# Patient Record
Sex: MALE
[De-identification: names, ages, dates, MRNs, and addresses within clinical notes are randomized per-mention and may not be internally consistent; named-entity substitution may affect disease eponyms.]

---

## 2024-10-01 ENCOUNTER — DASHBOARD ENCOUNTERS (OUTPATIENT)
Age: 69
End: 2024-10-01

## 2024-10-01 ENCOUNTER — OFFICE VISIT (OUTPATIENT)
Dept: URBAN - METROPOLITAN AREA CLINIC 115 | Facility: CLINIC | Age: 69
End: 2024-10-01
Payer: COMMERCIAL

## 2024-10-01 VITALS
SYSTOLIC BLOOD PRESSURE: 151 MMHG | HEART RATE: 76 BPM | TEMPERATURE: 97.3 F | DIASTOLIC BLOOD PRESSURE: 83 MMHG | RESPIRATION RATE: 15 BRPM | BODY MASS INDEX: 26.3 KG/M2 | WEIGHT: 167.6 LBS | HEIGHT: 67 IN

## 2024-10-01 DIAGNOSIS — R14.0 ABDOMINAL BLOATING: ICD-10-CM

## 2024-10-01 DIAGNOSIS — Z87.19 HISTORY OF GASTRITIS: ICD-10-CM

## 2024-10-01 PROBLEM — 27123005: Status: ACTIVE | Noted: 2024-10-01

## 2024-10-01 PROBLEM — 116289008: Status: ACTIVE | Noted: 2024-10-01

## 2024-10-01 PROBLEM — 691501000119103: Status: ACTIVE | Noted: 2024-10-01

## 2024-10-01 PROCEDURE — 99204 OFFICE O/P NEW MOD 45 MIN: CPT | Performed by: INTERNAL MEDICINE

## 2024-10-01 RX ORDER — PANTOPRAZOLE SODIUM 40 MG/1
1 TABLET TABLET, DELAYED RELEASE ORAL ONCE A DAY
Qty: 90 TABLET | Refills: 1 | OUTPATIENT
Start: 2024-10-01

## 2024-10-01 NOTE — HPI-TODAY'S VISIT:
70 y/o  Man from [place] with hx of gastritis on prior EGD's that refer chronic bloating and dyspepsia for almost a year,he refer multiple EGD,COlo, U/S,HIDA with no diagnostic results.Denie N/V/D,gi bleeding,anemia or weight loss.There is mention of GB sludge but no gallstones.No family hx of GI malignancy.no toxic habits.He removed cow milk from his diet,using almond milk.abdomen is soft and depresibe w/o significant tenderness.Pantoprazole 40mg hels but he is concern about long term use.I review with him the major risk are GI infections ,osteopenia,low magnesium.

## 2024-10-02 ENCOUNTER — WEB ENCOUNTER (OUTPATIENT)
Dept: URBAN - METROPOLITAN AREA CLINIC 92 | Facility: CLINIC | Age: 69
End: 2024-10-02

## 2024-10-10 LAB — RESULT:: (no result)

## 2025-04-04 ENCOUNTER — OFFICE VISIT (OUTPATIENT)
Dept: URBAN - METROPOLITAN AREA CLINIC 82 | Facility: CLINIC | Age: 70
End: 2025-04-04

## 2025-04-04 ENCOUNTER — TELEPHONE ENCOUNTER (OUTPATIENT)
Dept: URBAN - METROPOLITAN AREA CLINIC 92 | Facility: CLINIC | Age: 70
End: 2025-04-04

## 2025-04-04 RX ORDER — SIMETHICONE 180 MG/1
1 CAPSULE AFTER MEALS AND AT BEDTIME AS NEEDED CAPSULE, LIQUID FILLED ORAL TWICE A DAY
Qty: 180 CAPSULE | Refills: 0 | OUTPATIENT
Start: 2025-04-04 | End: 2025-07-03

## 2025-05-13 ENCOUNTER — OFFICE VISIT (OUTPATIENT)
Dept: URBAN - METROPOLITAN AREA CLINIC 115 | Facility: CLINIC | Age: 70
End: 2025-05-13
Payer: COMMERCIAL

## 2025-05-13 ENCOUNTER — LAB OUTSIDE AN ENCOUNTER (OUTPATIENT)
Dept: URBAN - METROPOLITAN AREA CLINIC 115 | Facility: CLINIC | Age: 70
End: 2025-05-13

## 2025-05-13 DIAGNOSIS — K82.8 GALLBLADDER SLUDGE: ICD-10-CM

## 2025-05-13 DIAGNOSIS — R14.0 ABDOMINAL BLOATING: ICD-10-CM

## 2025-05-13 DIAGNOSIS — K64.8 INTERNAL HEMORRHOIDS: ICD-10-CM

## 2025-05-13 DIAGNOSIS — Z87.19 HISTORY OF GASTRITIS: ICD-10-CM

## 2025-05-13 DIAGNOSIS — K30 INDIGESTION: ICD-10-CM

## 2025-05-13 PROBLEM — 90458007: Status: ACTIVE | Noted: 2025-05-13

## 2025-05-13 PROBLEM — 162031009: Status: ACTIVE | Noted: 2025-05-13

## 2025-05-13 PROCEDURE — 99214 OFFICE O/P EST MOD 30 MIN: CPT | Performed by: INTERNAL MEDICINE

## 2025-05-13 RX ORDER — PANTOPRAZOLE SODIUM 40 MG/1
1 TABLET TABLET, DELAYED RELEASE ORAL ONCE A DAY
Qty: 90 TABLET | Refills: 1 | Status: ACTIVE | COMMUNITY
Start: 2024-10-01

## 2025-05-13 RX ORDER — HYDROCORTISONE 2.5% 25 MG/G
1 APPLICATION CREAM TOPICAL TWICE A DAY
Qty: 60 GRAMS | Refills: 1 | OUTPATIENT
Start: 2025-05-13 | End: 2025-09-10

## 2025-05-13 RX ORDER — SIMETHICONE 180 MG/1
1 CAPSULE AFTER MEALS AND AT BEDTIME AS NEEDED CAPSULE, LIQUID FILLED ORAL TWICE A DAY
Qty: 180 CAPSULE | Refills: 0 | Status: ACTIVE | COMMUNITY
Start: 2025-04-04 | End: 2025-07-03

## 2025-05-13 NOTE — HPI-TODAY'S VISIT:
He still with dyspepsia and bloating on/off,hx of gastritis on prior CT. H pylori test was negative late 2024. Will schedule EGD to r/o PUD or neoplasm.We did not received prior endoscopic work-up.Continue OTC pro-biotics

## 2025-05-14 ENCOUNTER — CLAIMS CREATED FROM THE CLAIM WINDOW (OUTPATIENT)
Dept: URBAN - METROPOLITAN AREA CLINIC 4 | Facility: CLINIC | Age: 70
End: 2025-05-14
Payer: COMMERCIAL

## 2025-05-14 ENCOUNTER — CLAIMS CREATED FROM THE CLAIM WINDOW (OUTPATIENT)
Dept: URBAN - METROPOLITAN AREA SURGERY CENTER 13 | Facility: SURGERY CENTER | Age: 70
End: 2025-05-14
Payer: COMMERCIAL

## 2025-05-14 ENCOUNTER — TELEPHONE ENCOUNTER (OUTPATIENT)
Dept: URBAN - METROPOLITAN AREA CLINIC 82 | Facility: CLINIC | Age: 70
End: 2025-05-14

## 2025-05-14 DIAGNOSIS — K31.89 OTHER DISEASES OF STOMACH AND DUODENUM: ICD-10-CM

## 2025-05-14 DIAGNOSIS — K21.9 GASTRO-ESOPHAGEAL REFLUX DISEASE WITHOUT ESOPHAGITIS: ICD-10-CM

## 2025-05-14 DIAGNOSIS — K29.70 GASTRITIS WITHOUT BLEEDING, UNSPECIFIED CHRONICITY, UNSPECIFIED GASTRITIS TYPE: ICD-10-CM

## 2025-05-14 DIAGNOSIS — R10.13 DYSPEPSIA: ICD-10-CM

## 2025-05-14 DIAGNOSIS — K29.70 GASTRITIS, UNSPECIFIED, WITHOUT BLEEDING: ICD-10-CM

## 2025-05-14 PROCEDURE — 43239 EGD BIOPSY SINGLE/MULTIPLE: CPT | Performed by: INTERNAL MEDICINE

## 2025-05-14 PROCEDURE — 00731 ANES UPR GI NDSC PX NOS: CPT | Performed by: ANESTHESIOLOGIST ASSISTANT

## 2025-05-14 PROCEDURE — 00731 ANES UPR GI NDSC PX NOS: CPT | Performed by: ANESTHESIOLOGY

## 2025-05-14 PROCEDURE — 88342 IMHCHEM/IMCYTCHM 1ST ANTB: CPT | Performed by: PATHOLOGY

## 2025-05-14 PROCEDURE — 88305 TISSUE EXAM BY PATHOLOGIST: CPT | Performed by: PATHOLOGY

## 2025-05-29 ENCOUNTER — TELEPHONE ENCOUNTER (OUTPATIENT)
Dept: URBAN - METROPOLITAN AREA CLINIC 82 | Facility: CLINIC | Age: 70
End: 2025-05-29

## 2025-05-29 RX ORDER — RIFAXIMIN 550 MG/1
1 TABLET TABLET ORAL THREE TIMES A DAY
Qty: 42 TABLET | Refills: 0 | OUTPATIENT
Start: 2025-05-29 | End: 2025-06-12